# Patient Record
Sex: FEMALE | Race: AMERICAN INDIAN OR ALASKA NATIVE
[De-identification: names, ages, dates, MRNs, and addresses within clinical notes are randomized per-mention and may not be internally consistent; named-entity substitution may affect disease eponyms.]

---

## 2018-01-23 ENCOUNTER — HOSPITAL ENCOUNTER (EMERGENCY)
Dept: HOSPITAL 42 - ED | Age: 43
Discharge: HOME | End: 2018-01-23
Payer: COMMERCIAL

## 2018-01-23 VITALS
DIASTOLIC BLOOD PRESSURE: 83 MMHG | OXYGEN SATURATION: 100 % | HEART RATE: 75 BPM | TEMPERATURE: 98.7 F | SYSTOLIC BLOOD PRESSURE: 154 MMHG | RESPIRATION RATE: 18 BRPM
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
## 2018-01-23 DIAGNOSIS — S61.214A: Primary | ICD-10-CM
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
## 2018-01-23 NOTE — ED PDOC
Arrival/HPI





<Everardo Vargas - Last Filed: 01/23/18 22:35>





- General


Historian: Patient





<Shiva Soni - Last Filed: 01/24/18 17:06>





- General


Chief Complaint: Abnormal Skin Integrity


Time Seen by Provider: 01/23/18 20:31





- History of Present Illness


Narrative History of Present Illness (Text): 





01/23/18 20:39


43 y/o female, no significant pmh, nkda, last tetanus under 2 years ago, c/o 

rt. 4th digit s/p cut by the glass while at work about 1 hour ago.  Pt. stated 

that the wound was bleeding but resolved, no numbness or tingling, no 

palpitation, no rash, no night sweat, no other medical or psychological 

complaints. 


 (Shiva Soni)





Past Medical History





- Provider Review


Nursing Documentation Reviewed: Yes





- Psychiatric


Hx Substance Use: No





<Shiva Soni - Last Filed: 01/24/18 17:06>





Family/Social History





- Physician Review


Nursing Documentation Reviewed: Yes


Family/Social History: Unknown Family HX


Smoking Status: Never Smoked


Hx Alcohol Use: No


Hx Substance Use: No





<Shiva Soni - Last Filed: 01/24/18 17:06>





Allergies/Home Meds





<Everardo Vargas - Last Filed: 01/23/18 22:35>





<Shiva Soni - Last Filed: 01/24/18 17:06>


Allergies/Adverse Reactions: 


Allergies





No Known Allergies Allergy (Verified 01/23/18 20:08)


 








Home Medications: 


 Home Meds











 Medication  Instructions  Recorded  Confirmed


 


Multivitamin [Multivitamins] 1 tab PO DAILY 01/23/18 01/23/18














Review of Systems





- Review of Systems


Constitutional: absent: Fatigue, Fevers


Eyes: absent: Vision Changes


ENT: absent: Hearing Changes


Respiratory: absent: SOB, Cough


Cardiovascular: absent: Chest Pain


Gastrointestinal: absent: Abdominal Pain, Nausea, Vomiting


Skin: Laceration.  absent: Rash, Pruritis, Skin Lesions, Abscess, Ulcer


Psychiatric: absent: Anxiety, Depression, Suicidal Ideation





<Shiva Soni - Last Filed: 01/24/18 17:06>





Physical Exam


Vital Signs Reviewed: Yes


Temperature: Afebrile


Blood Pressure: Normal


Pulse: Regular


Respiratory Rate: Normal


Appearance: Positive for: Well-Appearing, Non-Toxic, Comfortable


Pain Distress: Mild


Mental Status: Positive for: Alert and Oriented X 3





- Systems Exam


Head: Present: Atraumatic, Normocephalic


Pupils: Present: PERRL


Extroacular Muscles: Present: EOMI


Conjunctiva: Present: Normal


Mouth: Present: Moist Mucous Membranes


Neck: Present: Normal Range of Motion


Respiratory/Chest: Present: Clear to Auscultation, Good Air Exchange.  No: 

Respiratory Distress, Accessory Muscle Use


Cardiovascular: Present: Regular Rate and Rhythm, Normal S1, S2.  No: Murmurs


Abdomen: Present: Normal Bowel Sounds.  No: Tenderness, Distention, Peritoneal 

Signs


Back: Present: Normal Inspection


Upper Extremity: Present: Normal Inspection, Other (Rt. hand 4th digit: visible 

wound approx. less than 0.5cm superficial laceration wound with no opening 

gapping, FROM without limitation, sensation intact, motor 5/5, +radial pulse, 

capillary refill< 2 seconds, neurovascular intact. ).  No: Cyanosis, Edema


Lower Extremity: Present: Normal Inspection.  No: Edema


Neurological: Present: GCS=15, CN II-XII Intact, Speech Normal


Skin: Present: Warm, Dry, Normal Color.  No: Rashes


Psychiatric: Present: Alert, Oriented x 3, Normal Insight, Normal Concentration





<Shiva Soni - Last Filed: 01/24/18 17:06>


Vital Signs











  Temp Pulse Resp BP Pulse Ox


 


 01/23/18 20:09  98.7 F  75  18  154/83 H  100














Medical Decision Making





<Everardo Vargas - Last Filed: 01/23/18 22:35>





- RAD Interpretation


: Radiologist





<Shiva Soni - Last Filed: 01/24/18 17:06>


ED Course and Treatment: 





01/23/18 20:44


-Xray r/o foreign bodies


-wound irrigate with normal saline, clean with betadine, bacitracin and gauze 

dressing.





01/23/18 21:58


-Xray show no fracture/dislocation/foreign bodies. 


-Cleocin ordered


-Discharge home with cleocin, motrin, follow up with your own pmd and hand 

specialist within 2 days, see employee health tomorrow, return to the Er for 

any new or worsening signs or symptoms. 


 (Shiva Soni)





- RAD Interpretation


Radiology Orders: 








01/23/18 20:45


HAND RIGHT 4TH DIGIT (FINGER) [RAD] Stat 




















01/23/18 20:45


HAND RIGHT 4TH DIGIT (FINGER) [RAD] Stat 








no acute fracture/dislocation/foreign bodies.  (Shiva Soni)





- Medication Orders


Current Medication Orders: 











Discontinued Medications





Clindamycin HCl (Cleocin)  300 mg PO STAT STA


   PRN Reason: Protocol


   Stop: 01/23/18 21:57


   Last Admin: 01/23/18 22:14  Dose: 300 mg











- PA / NP / Resident Statement


HOANG has reviewed & agrees with the documentation as recorded.


HOANG has examined the patient and agrees with the treatment plan.





<Everardo Vargas - Last Filed: 01/23/18 22:35>





- PA / NP / Resident Statement


HOANG has reviewed & agrees with the documentation as recorded.





<Shiva Soni - Last Filed: 01/24/18 17:06>





Disposition/Present on Arrival





<Everardo Vargas - Last Filed: 01/23/18 22:35>





- Present on Arrival


Any Indicators Present on Arrival: No


History of DVT/PE: No


History of Uncontrolled Diabetes: No


Urinary Catheter: No


History of Decub. Ulcer: No


History Surgical Site Infection Following: None





- Disposition


Have Diagnosis and Disposition been Completed?: Yes


Disposition Time: 20:45


Patient Plan: Discharge





<Shiva Soni - Last Filed: 01/24/18 17:06>





- Disposition


Diagnosis: 


 Finger laceration





Disposition: HOME/ ROUTINE


Condition: GOOD


Additional Instructions: 


-Discharge home with cleocin, motrin, follow up with your own pmd and hand 

specialist within 2 days, see employee health tomorrow, return to the Er for 

any new or worsening signs or symptoms. 





Prescriptions: 


Clindamycin [Cleocin] 300 mg PO TID #21 cap


Ibuprofen [Motrin] 600 mg PO TID PRN #21 tab


 PRN Reason: Other


Referrals: 


Abdoulaye Sauer MD [Primary Care Provider] - Follow up with primary


Freddy Macias MD [Staff Provider] - Follow up with primary


Forms:  [employer] (English), WORK NOTE

## 2018-01-24 NOTE — RAD
PROCEDURE:  Right ring finger radiographs. The the 



HISTORY:

4th digit tip glass laceration



COMPARISON:

None.



TECHNIQUE:

AP radiograph of the right hand, as well as spot oblique and lateral 

images of ring finger were obtained.



FINDINGS:



RIGHT RING FINGER:

Normal right ring finger, without fracture or focal lesion. Remainder 

of the right hand (as seen on the AP view) grossly unremarkable.



JOINTS:

Normal. 



SOFT TISSUES:

. No definitive radiopaque foreign bodies are identified. No the the 

the the the obvious subcutaneous emphysema 



OTHER FINDINGS:

None.



IMPRESSION:

No evidence of acute displaced fracture nor dislocation.  No 

definitive radiopaque foreign bodies are identified